# Patient Record
Sex: MALE | Race: AMERICAN INDIAN OR ALASKA NATIVE
[De-identification: names, ages, dates, MRNs, and addresses within clinical notes are randomized per-mention and may not be internally consistent; named-entity substitution may affect disease eponyms.]

---

## 2017-09-03 ENCOUNTER — HOSPITAL ENCOUNTER (EMERGENCY)
Dept: HOSPITAL 5 - ED | Age: 22
Discharge: HOME | End: 2017-09-03
Payer: COMMERCIAL

## 2017-09-03 VITALS — DIASTOLIC BLOOD PRESSURE: 78 MMHG | SYSTOLIC BLOOD PRESSURE: 125 MMHG

## 2017-09-03 DIAGNOSIS — K04.7: Primary | ICD-10-CM

## 2017-09-03 PROCEDURE — 99282 EMERGENCY DEPT VISIT SF MDM: CPT

## 2017-09-03 NOTE — EMERGENCY DEPARTMENT REPORT
HPI





- General


Chief Complaint: Dental/Oral


Time Seen by Provider: 09/03/17 22:36





- HPI


HPI: 





The patient is a 22-year-old male who presents to ED complaining  of 8/10 pain 

in the right side of his mouth x 3 days .  Patient states that the pain started 

5 days ago and has increased in severity over the last 2 days.  The pain is 

exacerbated by eating and opening of the mouth.


Patient states that it radiates towards  ear.  Patient describes a as a 

throbbing, pressure-like sensation.  Patient states otherwise well and has no 

other complaints.


Patient has had no fevers and no chills. No chest pain, no shortness of breath. 

No abdominal pain. No  shortness of breath or  recent trauma to the face. 





ED Past Medical Hx





- Past Medical History


Previous Medical History?: No





- Surgical History


Past Surgical History?: No





- Social History


Smoking Status: Never Smoker


Substance Use Type: None





- Medications


Home Medications: 


 Home Medications











 Medication  Instructions  Recorded  Confirmed  Last Taken  Type


 


Acetaminophen/Codeine [Tylenol 1 tab PO Q6H #10 tablet 09/03/17  Unknown Rx





/Codeine # 3 tab]     


 


Amoxicillin/K Clav Tab [Augmentin 1 each PO BID #20 tablet 09/03/17  Unknown Rx





875MG TAB]     


 


Ibuprofen [Motrin 400 MG tab] 400 mg PO Q8H PRN #20 tablet 09/03/17  Unknown Rx














ED Review of Systems


ROS: 


Stated complaint: TOOTHACHE


Other details as noted in HPI





Constitutional: denies: chills, fever


Eyes: denies: eye pain, eye discharge, vision change


ENT: dental pain.  denies: ear pain, throat pain, hearing loss, congestion


Respiratory: denies: cough, shortness of breath, wheezing


Cardiovascular: denies: chest pain, palpitations


Endocrine: no symptoms reported


Gastrointestinal: denies: abdominal pain, nausea, diarrhea


Genitourinary: denies: urgency, dysuria


Musculoskeletal: denies: back pain, joint swelling, arthralgia


Skin: denies: rash, lesions


Neurological: denies: headache, weakness, paresthesias


Psychiatric: denies: anxiety, depression


Hematological/Lymphatic: denies: easy bleeding, easy bruising





Physical Exam





- Physical Exam


Vital Signs: 


 Vital Signs











  09/03/17





  19:35


 


Temperature 99.4 F


 


Pulse Rate 57 L


 


Respiratory 17





Rate 


 


Blood Pressure 135/95


 


O2 Sat by Pulse 99





Oximetry 











Physical Exam: 





GENERAL: Alert and oriented x3, no apparent distress, Normal Gait, atraumatic.


HEAD: Head is normocephalic and a-traumatic.


EYES: Extra ocular muscles are intact. Pupils are equal, round, and reactive to 

light and accommodation.


EARS: symetrical, atraumatic, non tender, ear canal clear and moderate cerumen, 

tympanic membrance non inflamed. gross auditory nml bilaterally. 


NOSE: Nose symetrical, Nontender,Nares appeared normal.


MOUTH:Mouth is well hydrated and without lesions. Tonsils nonerythematous or 

swollen,  Uvula midline, Tongue not elevated. Mucous membranes are moist. 

Posterior pharynx clear, no exudate or lesions. Patent airways.  tooth #30 

shows dental caries and Gingival enlargement tooth #30, tenderness to palpation

, nontoxic bleeding no pus discharge


NECK: Supple. Non edematous, No carotid bruits.  No lymphadenopathy or 

thyromegaly.  No C-spine tenderness


LUNGS: Symetrical with respiration, No wheezing, no rales or crackles, CTAB.


HEART:  S1, S2 present, regular rate and rhythm without murmur, no rubs, no 

gallops. Non tender to palpation








PSYCHIATRIC: Mood is congruent with affect, denies suicidal or homicidal 

ideations.


SKIN:  Warm and dry, No lesions, No ulceration or induration present.











ED Course


 Vital Signs











  09/03/17





  19:35


 


Temperature 99.4 F


 


Pulse Rate 57 L


 


Respiratory 17





Rate 


 


Blood Pressure 135/95


 


O2 Sat by Pulse 99





Oximetry 














ED Medical Decision Making





- Medical Decision Making


22-year-old female who presents with righ-sided Facial pain secondary to 

odontogenic caries


ED course: Patient received 875 mg Augmentin, Tylenol No. 3. 


 Odontogenic infection versus ear infection. Based upon  history and physical 

examination,  pain is a result of an infection of tooth number 30 and that the 

pain Pt feels on the right side of his face and towards the ear is referred 

pain from this infectious process.  


Pt has no evidence of acute impending airway compromise.


At this point, patient will be discharged home on some antibiotics and pain 

trial,  he will do well with an outpatient course of antibiotics. 


Follow up with the Dental Clinic as referred


Vital signs are normal patient is in no acute distress.


Pt had an effect uneventful ED stay





Critical care attestation.: 


If time is entered above; I have spent that time in minutes in the direct care 

of this critically ill patient, excluding procedure time.








ED Disposition


Clinical Impression: 


 Dental abscess, Pain, dental





Disposition: [id number]-01 TO HOME OR SELFCARE


Is pt being admited?: No


Does the pt Need Aspirin: No


Condition: Stable


Instructions:  Dental Abscess (ED), Dental Caries (ED), Toothache (ED)


Prescriptions: 


Acetaminophen/Codeine [Tylenol /Codeine # 3 tab] 1 tab PO Q6H #10 tablet


Amoxicillin/K Clav Tab [Augmentin 875MG TAB] 1 each PO BID #20 tablet


Ibuprofen [Motrin 400 MG tab] 400 mg PO Q8H PRN #20 tablet


 PRN Reason: Pain


Referrals: 


PRIMARY CARE,MD [Primary Care Provider] - 3-5 Days


[facility] [Outside] - 3-5 Days


[facility] [Outside] - 3-5 Days


Forms:  Accompanied Note, Work/School Release Form(ED)


Time of Disposition: 23:29

## 2018-08-13 ENCOUNTER — HOSPITAL ENCOUNTER (EMERGENCY)
Dept: HOSPITAL 5 - ED | Age: 23
Discharge: HOME | End: 2018-08-13
Payer: SELF-PAY

## 2018-08-13 VITALS — SYSTOLIC BLOOD PRESSURE: 114 MMHG | DIASTOLIC BLOOD PRESSURE: 79 MMHG
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
## 2018-08-13 DIAGNOSIS — F17.200: ICD-10-CM
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
## 2018-08-13 DIAGNOSIS — S63.611A: Primary | ICD-10-CM
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
## 2018-08-13 NOTE — XRAY REPORT
FINAL REPORT



PROCEDURE:  XR HAND 3+V RT



TECHNIQUE:  RIGHT hand radiographs, AP, lateral, and oblique

views.



HISTORY:  Right finger pain. 



COMPARISON:  No prior studies are available for comparison.



FINDINGS:  

Fracture (s) and/or Dislocation(s): None .



Alignment: Normal .



Joint space(s): Normal .



Soft tissues: Normal .



Bone mineralization: Normal .



Foreign bodies: None .







IMPRESSION:  

No radiographic evidence of acute abnormality.

## 2019-02-08 ENCOUNTER — HOSPITAL ENCOUNTER (EMERGENCY)
Dept: HOSPITAL 5 - ED | Age: 24
Discharge: HOME | End: 2019-02-08
Payer: SELF-PAY

## 2019-02-08 VITALS — SYSTOLIC BLOOD PRESSURE: 104 MMHG | DIASTOLIC BLOOD PRESSURE: 66 MMHG

## 2019-02-08 DIAGNOSIS — R11.2: ICD-10-CM

## 2019-02-08 DIAGNOSIS — M79.10: ICD-10-CM

## 2019-02-08 DIAGNOSIS — K52.9: ICD-10-CM

## 2019-02-08 DIAGNOSIS — J18.1: Primary | ICD-10-CM

## 2019-02-08 DIAGNOSIS — E86.0: ICD-10-CM

## 2019-02-08 LAB
ALBUMIN SERPL-MCNC: 4.5 G/DL (ref 3.9–5)
ALT SERPL-CCNC: 13 UNITS/L (ref 7–56)
BACTERIA #/AREA URNS HPF: (no result) /HPF
BASOPHILS # (AUTO): 0.1 K/MM3 (ref 0–0.1)
BASOPHILS NFR BLD AUTO: 1.1 % (ref 0–1.8)
BILIRUB UR QL STRIP: (no result)
BLOOD UR QL VISUAL: (no result)
BUN SERPL-MCNC: 18 MG/DL (ref 9–20)
BUN/CREAT SERPL: 20 %
CALCIUM SERPL-MCNC: 9.3 MG/DL (ref 8.4–10.2)
EOSINOPHIL # BLD AUTO: 0 K/MM3 (ref 0–0.4)
EOSINOPHIL NFR BLD AUTO: 0 % (ref 0–4.3)
HCT VFR BLD CALC: 50.7 % (ref 35.5–45.6)
HEMOLYSIS INDEX: 13
HGB BLD-MCNC: 17.7 GM/DL (ref 11.8–15.2)
LYMPHOCYTES # BLD AUTO: 1.8 K/MM3 (ref 1.2–5.4)
LYMPHOCYTES NFR BLD AUTO: 22.8 % (ref 13.4–35)
MCHC RBC AUTO-ENTMCNC: 35 % (ref 32–34)
MCV RBC AUTO: 87 FL (ref 84–94)
MONOCYTES # (AUTO): 0.6 K/MM3 (ref 0–0.8)
MONOCYTES % (AUTO): 7.9 % (ref 0–7.3)
MUCOUS THREADS #/AREA URNS HPF: (no result) /HPF
PH UR STRIP: 5 [PH] (ref 5–7)
PLATELET # BLD: 170 K/MM3 (ref 140–440)
RBC # BLD AUTO: 5.82 M/MM3 (ref 3.65–5.03)
RBC #/AREA URNS HPF: < 1 /HPF (ref 0–6)
UROBILINOGEN UR-MCNC: < 2 MG/DL (ref ?–2)
WBC #/AREA URNS HPF: 6 /HPF (ref 0–6)

## 2019-02-08 PROCEDURE — 87400 INFLUENZA A/B EACH AG IA: CPT

## 2019-02-08 PROCEDURE — 81001 URINALYSIS AUTO W/SCOPE: CPT

## 2019-02-08 PROCEDURE — 94640 AIRWAY INHALATION TREATMENT: CPT

## 2019-02-08 PROCEDURE — 85025 COMPLETE CBC W/AUTO DIFF WBC: CPT

## 2019-02-08 PROCEDURE — 96361 HYDRATE IV INFUSION ADD-ON: CPT

## 2019-02-08 PROCEDURE — 87040 BLOOD CULTURE FOR BACTERIA: CPT

## 2019-02-08 PROCEDURE — 74019 RADEX ABDOMEN 2 VIEWS: CPT

## 2019-02-08 PROCEDURE — 83690 ASSAY OF LIPASE: CPT

## 2019-02-08 PROCEDURE — 99284 EMERGENCY DEPT VISIT MOD MDM: CPT

## 2019-02-08 PROCEDURE — 80053 COMPREHEN METABOLIC PANEL: CPT

## 2019-02-08 PROCEDURE — 96375 TX/PRO/DX INJ NEW DRUG ADDON: CPT

## 2019-02-08 PROCEDURE — 71046 X-RAY EXAM CHEST 2 VIEWS: CPT

## 2019-02-08 PROCEDURE — 36415 COLL VENOUS BLD VENIPUNCTURE: CPT

## 2019-02-08 PROCEDURE — 96365 THER/PROPH/DIAG IV INF INIT: CPT

## 2019-02-08 RX ADMIN — DICYCLOMINE HYDROCHLORIDE ONE MG: 20 TABLET ORAL at 18:50

## 2019-02-08 RX ADMIN — DICYCLOMINE HYDROCHLORIDE ONE: 20 TABLET ORAL at 18:57

## 2019-02-08 RX ADMIN — SODIUM CHLORIDE ONE MLS/HR: 0.9 INJECTION, SOLUTION INTRAVENOUS at 19:50

## 2019-02-08 RX ADMIN — SODIUM CHLORIDE ONE MLS/HR: 0.9 INJECTION, SOLUTION INTRAVENOUS at 19:51

## 2019-02-08 NOTE — EMERGENCY DEPARTMENT REPORT
ED N/V/D HPI





- General


Chief complaint: Nausea/Vomiting/Diarrhea


Stated complaint: FLU S/SX


Time Seen by Provider: 19 17:46


Source: patient, family


Mode of arrival: Ambulatory


Limitations: No Limitations





- History of Present Illness


Initial comments: 





This is a 24-year-old male here report that he has generalized weakness and 

nausea and diarrhea over the last couple days.  His mom said it has been 3 days 

he has been having some coughing also.  Mom reported the patient was having 10 

but she did not take his temperature but he felt hot and she gave him Advil at 3

PM.  Patient said he is feeling weak all over.  Denies any shortness of breath 

or chest pain.  Denies any earache nasal congestion or runny nose.  Denies any 

abdominal pain.  Denies any blood in vomit or in diarrhea.  Patient has no 

medical problems.  Denies any wheezing or stridor.  Denies any urinary burning, 

frequency or urgency and denies any back pain.


MD complaint: nausea, vomiting, diarrhea, other (upper respiratory symptoms)


Onset/Timing: 3


-: days(s)


Description of Vomiting: bilious


Description of Diarrhea: water


Associated Abdominal Pain: No (generalized aching to body)


Location: diffuse


Pain Scale: 8


Quality: aching


Consistency: constant


Improves with: none


Worsens with: none


Context: other (unknown)


Associated Symptoms: myalgias, cough, diaphoresis, fever/chills, loss of 

appetite, malaise, nausea/vomiting, weakness.  denies: chest pain, headaches, 

rash, dysuria, shortness of breath, syncope





- Related Data


                                  Previous Rx's











 Medication  Instructions  Recorded  Last Taken  Type


 


Acetaminophen/Codeine [Tylenol 1 tab PO Q6H #10 tablet 17 Unknown Rx





/Codeine # 3 tab]    


 


Amoxicillin/K Clav Tab [Augmentin 1 each PO BID #20 tablet 17 Unknown Rx





875MG TAB]    


 


Ibuprofen [Motrin 400 MG tab] 400 mg PO Q8H PRN #20 tablet 17 Unknown Rx


 


Ibuprofen [Motrin] 600 mg PO Q8H PRN #30 tablet 18 Unknown Rx


 


ALBUTEROL Inhaler(NF) [VENTOLIN 2 puff IH Q6H PRN 1 Days #1 inha 19 

Unknown Rx





Inhaler(NF)]    


 


Dicyclomine [Bentyl] 40 mg PO QID 3 Days #12 tablet 19 Unknown Rx


 


Doxycycline [Vibramycin CAP] 100 mg PO Q12HR 10 Days #20 capsule 19 

Unknown Rx


 


Famotidine [Pepcid] 20 mg PO BID 6 Days #12 tablet 19 Unknown Rx


 


Promethazine [Phenergan TAB] 25 mg PO Q6HR PRN #16 tab 19 Unknown Rx


 


guaiFENesin/DEXTROMETHORPHAN 10 ml PO Q6H 5 Days #200 liquid 19 Unknown Rx





[Robitussin Cough-Chest Dm Liq]    











                                    Allergies











Allergy/AdvReac Type Severity Reaction Status Date / Time


 


No Known Allergies Allergy   Verified 19 17:45














ED Review of Systems


ROS: 


Stated complaint: FLU S/SX


Other details as noted in HPI





Constitutional: chills, fever, malaise


Eyes: denies: eye discharge


ENT: throat pain.  denies: ear pain, congestion


Respiratory: cough.  denies: shortness of breath, SOB with exertion, wheezing


Cardiovascular: denies: chest pain, palpitations, dyspnea on exertion, edema, 

syncope


Gastrointestinal: nausea, vomiting, diarrhea.  denies: abdominal pain, 

constipation, hematemesis, melena, hematochezia


Genitourinary: denies: urgency, dysuria, hematuria


Musculoskeletal: myalgia.  denies: back pain, joint swelling, arthralgia


Skin: denies: rash


Neurological: denies: headache, numbness, paresthesias, confusion, abnormal 

gait, vertigo





ED Past Medical Hx





- Past Medical History


Previous Medical History?: No





- Surgical History


Past Surgical History?: No





- Family History


Family history: no significant





- Social History


Smoking Status: Never Smoker


Substance Use Type: None





- Medications


Home Medications: 


                                Home Medications











 Medication  Instructions  Recorded  Confirmed  Last Taken  Type


 


Acetaminophen/Codeine [Tylenol 1 tab PO Q6H #10 tablet 17  Unknown Rx





/Codeine # 3 tab]     


 


Amoxicillin/K Clav Tab [Augmentin 1 each PO BID #20 tablet 17  Unknown Rx





875MG TAB]     


 


Ibuprofen [Motrin 400 MG tab] 400 mg PO Q8H PRN #20 tablet 17  Unknown Rx


 


Ibuprofen [Motrin] 600 mg PO Q8H PRN #30 tablet 18  Unknown Rx


 


ALBUTEROL Inhaler(NF) [VENTOLIN 2 puff IH Q6H PRN 1 Days #1 inha 19  

Unknown Rx





Inhaler(NF)]     


 


Dicyclomine [Bentyl] 40 mg PO QID 3 Days #12 tablet 19  Unknown Rx


 


Doxycycline [Vibramycin CAP] 100 mg PO Q12HR 10 Days #20 capsule 19  

Unknown Rx


 


Famotidine [Pepcid] 20 mg PO BID 6 Days #12 tablet 19  Unknown Rx


 


Promethazine [Phenergan TAB] 25 mg PO Q6HR PRN #16 tab 19  Unknown Rx


 


guaiFENesin/DEXTROMETHORPHAN 10 ml PO Q6H 5 Days #200 liquid 19  Unknown 

Rx





[Robitussin Cough-Chest Dm Liq]     














ED Physical Exam





- General


Limitations: No Limitations


General appearance: alert, in no apparent distress





- Head


Head exam: Present: atraumatic, normocephalic, normal inspection, other (normal 

exam)





- Eye


Eye exam: Present: normal appearance, PERRL, EOMI


Pupils: Present: normal accommodation





- ENT


ENT exam: Present: normal orophraynx, mucous membranes dry, TM's normal 

bilaterally, normal external ear exam.  Absent: normal exam





- Neck


Neck exam: Present: normal inspection, full ROM, other (no C-spine tenderness). 

Absent: tenderness, lymphadenopathy





- Respiratory


Respiratory exam: Present: other (dry cough).  Absent: normal lung sounds 

bilaterally, respiratory distress, wheezes, rales, rhonchi, stridor, chest wall 

tenderness, accessory muscle use, decreased breath sounds, prolonged expiratory





- Cardiovascular


Cardiovascular Exam: Present: regular rate, normal rhythm, normal heart sounds. 

Absent: systolic murmur, diastolic murmur





- GI/Abdominal


GI/Abdominal exam: Present: soft, normal bowel sounds.  Absent: distended, 

tenderness, guarding, rebound, rigid, organomegaly, mass





- Extremities Exam


Extremities exam: Present: normal inspection, full ROM, normal capillary refill,

other (No cce. + 2 pulses in all extremities, no neurovascular compromise).  

Absent: tenderness, pedal edema, joint swelling, calf tenderness





- Back Exam


Back exam: Present: normal inspection, full ROM, other (ambulates without any 

difficulties).  Absent: tenderness, CVA tenderness (R), CVA tenderness (L), 

muscle spasm, paraspinal tenderness, vertebral tenderness, rash noted





- Neurological Exam


Neurological exam: Present: alert, oriented X3, normal gait, reflexes normal, 

other (no focal deficits).  Absent: motor sensory deficit





- Psychiatric


Psychiatric exam: Present: normal affect, normal mood





- Skin


Skin exam: Present: warm, dry, intact, normal color.  Absent: rash





ED Course


                                   Vital Signs











  19





  17:45 21:23 21:48


 


Temperature 98.6 F  


 


Pulse Rate 87  


 


Pulse Rate [  63 65





Anterior   





Bilateral   





Throughout]   


 


Respiratory 16  





Rate   


 


Respiratory  16 14





Rate [Anterior   





Bilateral   





Throughout]   


 


Blood Pressure 104/66  


 


O2 Sat by Pulse 98  





Oximetry   














  19





  23:22


 


Temperature 


 


Pulse Rate 75


 


Pulse Rate [ 





Anterior 





Bilateral 





Throughout] 


 


Respiratory 16





Rate 


 


Respiratory 





Rate [Anterior 





Bilateral 





Throughout] 


 


Blood Pressure 


 


O2 Sat by Pulse 98





Oximetry 














- Reevaluation(s)


Reevaluation #1: 





19 20:26


Patient was given normal saline 1 L, Zofran 8 mg IV, Bentyl 40 mg by mouth, 

lidocaine 15 mL and Maalox 30 mL and emergency room and he said he felt better.








Reevaluation #2: 





19 22:26


Patient was also given a second liter of IV fluid for dehydration.  Patient is 

feeling a lot better.  Was found to have pneumonia and has left lower lobe and I

spoke with Dr. Youngblood emergency room attending doctor and it was agreed on 

that patient will be placed on Zithromax IV 1 dose and sent home on 

doxycycline.  Patient lab work is stable to include CBC, and CMP.  His CBC shows

that he had some hemoconcentration and urinalysis shows the patient would small 

amount of ketones in his urine.  Patient received albuterol 5 mg nebulizer and 

Xopenex 1 mg nebulizer and his lung sounds remained clear throughout ED stay











ED Medical Decision Making





- Lab Data


Result diagrams: 


                                 19 17:53





                                 19 17:53








                                   Lab Results











  19 Range/Units





  17:53 17:53 18:22 


 


WBC  7.7    (4.5-11.0)  K/mm3


 


RBC  5.82 H    (3.65-5.03)  M/mm3


 


Hgb  17.7 H    (11.8-15.2)  gm/dl


 


Hct  50.7 H    (35.5-45.6)  %


 


MCV  87    (84-94)  fl


 


MCH  30    (28-32)  pg


 


MCHC  35 H    (32-34)  %


 


RDW  12.8 L    (13.2-15.2)  %


 


Plt Count  170    (140-440)  K/mm3


 


Lymph % (Auto)  22.8    (13.4-35.0)  %


 


Mono % (Auto)  7.9 H    (0.0-7.3)  %


 


Eos % (Auto)  0.0    (0.0-4.3)  %


 


Baso % (Auto)  1.1    (0.0-1.8)  %


 


Lymph #  1.8    (1.2-5.4)  K/mm3


 


Mono #  0.6    (0.0-0.8)  K/mm3


 


Eos #  0.0    (0.0-0.4)  K/mm3


 


Baso #  0.1    (0.0-0.1)  K/mm3


 


Seg Neutrophils %  68.2    (40.0-70.0)  %


 


Seg Neutrophils #  5.2    (1.8-7.7)  K/mm3


 


Sodium   137   (137-145)  mmol/L


 


Potassium   3.7   (3.6-5.0)  mmol/L


 


Chloride   97.0 L   ()  mmol/L


 


Carbon Dioxide   25   (22-30)  mmol/L


 


Anion Gap   19   mmol/L


 


BUN   18   (9-20)  mg/dL


 


Creatinine   0.9   (0.8-1.5)  mg/dL


 


Estimated GFR   > 60   ml/min


 


BUN/Creatinine Ratio   20   %


 


Glucose   92   ()  mg/dL


 


Calcium   9.3   (8.4-10.2)  mg/dL


 


Total Bilirubin   0.60   (0.1-1.2)  mg/dL


 


AST   24   (5-40)  units/L


 


ALT   13   (7-56)  units/L


 


Alkaline Phosphatase   69   ()  units/L


 


Total Protein   8.1   (6.3-8.2)  g/dL


 


Albumin   4.5   (3.9-5)  g/dL


 


Albumin/Globulin Ratio   1.3   %


 


Lipase   13   (13-60)  units/L


 


Urine Color    [place]  (Yellow)  


 


Urine Turbidity    Clear  (Clear)  


 


Urine pH    5.0  (5.0-7.0)  


 


Ur Specific Gravity    1.027  (1.003-1.030)  


 


Urine Protein    100 mg/dl  (Negative)  mg/dL


 


Urine Glucose (UA)    Neg  (Negative)  mg/dL


 


Urine Ketones    Tr  (Negative)  mg/dL


 


Urine Blood    Neg  (Negative)  


 


Urine Nitrite    Neg  (Negative)  


 


Urine Bilirubin    Neg  (Negative)  


 


Urine Urobilinogen    < 2.0  (<2.0)  mg/dL


 


Ur Leukocyte Esterase    Neg  (Negative)  


 


Urine WBC (Auto)    6.0  (0.0-6.0)  /HPF


 


Urine RBC (Auto)    < 1.0  (0.0-6.0)  /HPF


 


U Epithel Cells (Auto)    1.0  (0-13.0)  /HPF


 


Urine Bacteria (Auto)    1+  (Negative)  /HPF


 


Urine Mucus    3+  /HPF


 


Influenza A (Rapid)     (Negative)  


 


Influenza B (Rapid)     (Negative)  














  19 Range/Units





  18:45 


 


WBC   (4.5-11.0)  K/mm3


 


RBC   (3.65-5.03)  M/mm3


 


Hgb   (11.8-15.2)  gm/dl


 


Hct   (35.5-45.6)  %


 


MCV   (84-94)  fl


 


MCH   (28-32)  pg


 


MCHC   (32-34)  %


 


RDW   (13.2-15.2)  %


 


Plt Count   (140-440)  K/mm3


 


Lymph % (Auto)   (13.4-35.0)  %


 


Mono % (Auto)   (0.0-7.3)  %


 


Eos % (Auto)   (0.0-4.3)  %


 


Baso % (Auto)   (0.0-1.8)  %


 


Lymph #   (1.2-5.4)  K/mm3


 


Mono #   (0.0-0.8)  K/mm3


 


Eos #   (0.0-0.4)  K/mm3


 


Baso #   (0.0-0.1)  K/mm3


 


Seg Neutrophils %   (40.0-70.0)  %


 


Seg Neutrophils #   (1.8-7.7)  K/mm3


 


Sodium   (137-145)  mmol/L


 


Potassium   (3.6-5.0)  mmol/L


 


Chloride   ()  mmol/L


 


Carbon Dioxide   (22-30)  mmol/L


 


Anion Gap   mmol/L


 


BUN   (9-20)  mg/dL


 


Creatinine   (0.8-1.5)  mg/dL


 


Estimated GFR   ml/min


 


BUN/Creatinine Ratio   %


 


Glucose   ()  mg/dL


 


Calcium   (8.4-10.2)  mg/dL


 


Total Bilirubin   (0.1-1.2)  mg/dL


 


AST   (5-40)  units/L


 


ALT   (7-56)  units/L


 


Alkaline Phosphatase   ()  units/L


 


Total Protein   (6.3-8.2)  g/dL


 


Albumin   (3.9-5)  g/dL


 


Albumin/Globulin Ratio   %


 


Lipase   (13-60)  units/L


 


Urine Color   (Yellow)  


 


Urine Turbidity   (Clear)  


 


Urine pH   (5.0-7.0)  


 


Ur Specific Gravity   (1.003-1.030)  


 


Urine Protein   (Negative)  mg/dL


 


Urine Glucose (UA)   (Negative)  mg/dL


 


Urine Ketones   (Negative)  mg/dL


 


Urine Blood   (Negative)  


 


Urine Nitrite   (Negative)  


 


Urine Bilirubin   (Negative)  


 


Urine Urobilinogen   (<2.0)  mg/dL


 


Ur Leukocyte Esterase   (Negative)  


 


Urine WBC (Auto)   (0.0-6.0)  /HPF


 


Urine RBC (Auto)   (0.0-6.0)  /HPF


 


U Epithel Cells (Auto)   (0-13.0)  /HPF


 


Urine Bacteria (Auto)   (Negative)  /HPF


 


Urine Mucus   /HPF


 


Influenza A (Rapid)  Negative  (Negative)  


 


Influenza B (Rapid)  Negative  (Negative)  








Blood cultures 2 sent 





- Radiology Data


Radiology results: report reviewed





X-ray of abdomen 2 view and chest x-ray dictated by radiologist and reviewed by 

myself


[facility] 


[address] [place] [address] 





XRay Report 


Signed 





Patient: MATTHEW DAVIS MR#: [id number] 


: 1995 Acct:[id number] 


Age/Sex: 24 / M ADM Date: 19 


Loc: ED 


Attending Dr: 








Ordering Physician: ELAN JENKINS 


Date of Service: 19 


Procedure(s): XR abdomen 2V 


Accession Number(s): [id number] 





cc: ELAN JENKINS 





Fluoro Time In Minutes: 





FINAL REPORT 





EXAM: XR ABDOMEN 2V 





HISTORY: NVD, fever 





TECHNIQUE: Supine and upright views of the abdomen. 





PRIORS: None. 





FINDINGS: 


The bowel gas pattern appears normal. There is no evidence of ileus or 

obstruction. There are no 


suspicious calcifications. 





The bones and soft tissues are unremarkable. 





IMPRESSION: 


No evidence of acute abdominal disease. 











Transcribed By: RENU 


Dictated By: SEBASTIAN SPARKS MD 


Electronically Authenticated By: SEBASTIAN SPARKS MD 


Signed Date/Time: 19 











DD/DT: 19 


TD/TT: 19 





Findings


[facility] 


[address] [place] [address] 





XRay Report 


Signed 





Patient: MATTHEW DAVIS MR#: [id number] 


: 1995 Acct:[id number] 


Age/Sex: 24 / M ADM Date: 19 


Loc: ED 


Attending Dr: 








Ordering Physician: ELAN JENKINS 


Date of Service: 19 


Procedure(s): XR chest routine 2V 


Accession Number(s): [id number] 





cc: ELAN JENKINS 





Fluoro Time In Minutes: 





FINAL REPORT 





EXAM: XR CHEST ROUTINE 2V 





HISTORY: cough, fever, 





TECHNIQUE: 2 views of the chest. 





PRIORS: None. 





FINDINGS: 


The cardiomediastinal silhouette appears normal. There is a mild left upper lobe

airspace 


infiltrate. The right lung is clear. The bones and soft tissues are 

unremarkable. 





IMPRESSION: 


Mild left upper lobe airspace infiltrate consistent with pneumonia 











Transcribed By: RENU 


Dictated By: SEBASTIAN SPARKS MD 


Electronically Authenticated By: SEBASTIAN SPARKS MD 


Signed Date/Time: 19 











DD/DT: 19 


TD/TT: 19





- Medical Decision Making





This is a 24-year-old male well-nourished well-developed and here for 

gastroenteritis or a problem with complaint of fever and cough.  Chest x-ray 

shows patient with pneumonia to mid lobe, lab work show hemoconcentration CBC, 

chemistry stable and urinalysis is stable.  The cultures 2 sent.  Patient was 

given medication for nausea vomiting and diarrhea to include Bentyl, lidocaine 

and Maalox to sooth stomach,.  To he was also given IV fluid normal saline 2 L 

and Zofran IV for nausea.  I discussed lab results and x-ray results the patient

and he was understanding.  Patient given Zithromax 500 mg IV to cover pneumonia.

 I spoke with Dr. Youngblood and he is in agreement with treatment plan.  Patient 

to be discharged home on doxycycline and albuterol HFA and Robitussin-DM to 

cover pneumonia, cough.  He is to follow-up with his primary care physician or 

if not [facility] in 3 days and if condition worsens to return to the 

emergency room





- Differential Diagnosis


PNA, viral syndrome, pancreatitis, liver disease OBS, gastroenteritis, UTI


Critical care attestation.: 


If time is entered above; I have spent that time in minutes in the direct care 

of this critically ill patient, excluding procedure time.








ED Disposition


Clinical Impression: 


 Cough in adult, Nausea vomiting and diarrhea





Pneumonia, community acquired


Qualifiers:


 Laterality: left Lung location: upper lobe of lung Qualified Code(s): J18.1 - 

Lobar pneumonia, unspecified organism





Disposition:  TO HOME OR SELFCARE


Is pt being admited?: No


Does the pt Need Aspirin: No


Condition: Stable


Instructions:  Dehydration (ED), Gastroenteritis (ED), Acute Nausea and Vomiting

(ED), Acute Diarrhea (ED), Community-acquired Pneumonia (ED), Nutrition Tips for

Relief of Diarrhea (ED), Acute Cough (ED)


Additional Instructions: 


Please follow up with primary care physician in 3 days and if you do not have a 

primary care physician follow-up at [facility]


Start taking doxycycline and at 10 AM in the morning and take it twice a day for

10 days.


Use albuterol inhaler every 6 hours 2 days and then as needed


If you develop fever and/or pain . take 500 mg OTC Tylenol every 6 hours as 

needed.  Do not exceed 4 g daily


Increase her fluid intake to 2-3 L of water daily.


Take Bentyl, Phenergan nausea and vomiting.  Please do not drive or operate 

heavy machinery while taking Phenergan as this medication causes drowsiness


Start with diet that includes banana, rice, applesauce and toast, Gatorade and 

water and after 3 days he can advance slowly.


Prescriptions: 


ALBUTEROL Inhaler(NF) [VENTOLIN Inhaler(NF)] 2 puff IH Q6H PRN 1 Days #1 inha


 PRN Reason: cough and wheezing


Dicyclomine [Bentyl] 40 mg PO QID 3 Days #12 tablet


Doxycycline [Vibramycin CAP] 100 mg PO Q12HR 10 Days #20 capsule


Famotidine [Pepcid] 20 mg PO BID 6 Days #12 tablet


guaiFENesin/DEXTROMETHORPHAN [Robitussin Cough-Chest Dm Liq] 10 ml PO Q6H 5 Days

#200 liquid


Promethazine [Phenergan TAB] 25 mg PO Q6HR PRN #16 tab


 PRN Reason: Nausea


Referrals: 


RAMONA RITCHIE MD [Primary Care Provider] - 19


[facility] [Outside] - 19


Forms:  Accompanied Note, Work/School Release Form(ED)

## 2019-02-08 NOTE — XRAY REPORT
FINAL REPORT



EXAM:  XR ABDOMEN 2V



HISTORY:  NVD, fever



TECHNIQUE:  Supine and upright views of the abdomen.



PRIORS:  None.



FINDINGS:  

The bowel gas pattern appears normal. There is no evidence of ileus or obstruction. There are no susp
icious calcifications.



The bones and soft tissues are unremarkable.



IMPRESSION:  

No evidence of acute abdominal disease.

## 2019-02-08 NOTE — XRAY REPORT
FINAL REPORT



EXAM:  XR CHEST ROUTINE 2V



HISTORY:  cough, fever, 



TECHNIQUE:  2 views of the chest.



PRIORS:  None.



FINDINGS:  

The cardiomediastinal silhouette appears normal. There is a mild left upper lobe airspace infiltrate.
 The right lung is clear. The bones and soft tissues are unremarkable.



IMPRESSION:  

Mild left upper lobe airspace infiltrate consistent with pneumonia

## 2019-08-14 NOTE — EMERGENCY DEPARTMENT REPORT
ED Upper Extremity Inj HPI





- General


Chief Complaint: Extremity Injury, Upper


Stated Complaint: DISLOCATED FINGER


Time Seen by Provider: 08/13/18 17:02


Source: patient


Mode of arrival: Ambulatory


Limitations: No Limitations





- History of Present Illness


Initial Comments: 





This is a 23-year-old male nontoxic, well nourished in appearance, no acute 

signs of distress presents to the ED with c/o of left index finger pain 3 

days.  Patient stated that he was playing basketball and injured his finger 

during playing basketball.  Patient denies any other trauma.  Patient denies 

any numbness, tingling, fever, chills, nausea, vomiting, chest pain, shortness 

of breath, headache, stiff neck.  Patient denies any joint swelling or joint 

redness.  Patient stated has decreased range of motion due to pain.  Patient 

denies any allergies or significant past medical history.


MD Complaint: Injury to:: left, finger


-: days(s) (3)


Other Extremity Injury: Fingers: Left


Other Injuries: none


Place: outdoors


Severity scale (0 -10): 8


Improves With: immobilization


Worsens With: movement of extremity


Context: direct blow


Associated Symptoms: denies other symptoms.  denies: weakness, numbness, neck 

pain, suspects foreign body, nausea/vomiting, heard/felt popping sensat





- Related Data


 Previous Rx's











 Medication  Instructions  Recorded  Last Taken  Type


 


Acetaminophen/Codeine [Tylenol 1 tab PO Q6H #10 tablet 09/03/17 Unknown Rx





/Codeine # 3 tab]    


 


Amoxicillin/K Clav Tab [Augmentin 1 each PO BID #20 tablet 09/03/17 Unknown Rx





875MG TAB]    


 


Ibuprofen [Motrin 400 MG tab] 400 mg PO Q8H PRN #20 tablet 09/03/17 Unknown Rx


 


Ibuprofen [Motrin] 600 mg PO Q8H PRN #30 tablet 08/13/18 Unknown Rx











 Allergies











Allergy/AdvReac Type Severity Reaction Status Date / Time


 


No Known Allergies Allergy   Verified 08/13/18 16:15














ED Review of Systems


ROS: 


Stated complaint: DISLOCATED FINGER


Other details as noted in HPI





Constitutional: denies: chills, fever


Eyes: denies: eye pain, eye discharge, vision change


ENT: denies: ear pain, throat pain


Respiratory: denies: cough, shortness of breath, wheezing


Cardiovascular: denies: chest pain, palpitations


Endocrine: no symptoms reported


Gastrointestinal: denies: abdominal pain, nausea, diarrhea


Genitourinary: denies: urgency, dysuria


Musculoskeletal: denies: back pain, joint swelling, arthralgia


Skin: denies: rash, lesions


Neurological: denies: headache, weakness, paresthesias


Psychiatric: denies: anxiety, depression


Hematological/Lymphatic: denies: easy bleeding, easy bruising





ED Past Medical Hx





- Past Medical History


Previous Medical History?: No





- Surgical History


Past Surgical History?: No





- Social History


Smoking Status: Current Every Day Smoker


Substance Use Type: None





- Medications


Home Medications: 


 Home Medications











 Medication  Instructions  Recorded  Confirmed  Last Taken  Type


 


Acetaminophen/Codeine [Tylenol 1 tab PO Q6H #10 tablet 09/03/17  Unknown Rx





/Codeine # 3 tab]     


 


Amoxicillin/K Clav Tab [Augmentin 1 each PO BID #20 tablet 09/03/17  Unknown Rx





875MG TAB]     


 


Ibuprofen [Motrin 400 MG tab] 400 mg PO Q8H PRN #20 tablet 09/03/17  Unknown Rx


 


Ibuprofen [Motrin] 600 mg PO Q8H PRN #30 tablet 08/13/18  Unknown Rx














ED Physical Exam





- General


Limitations: No Limitations


General appearance: alert, in no apparent distress





- Head


Head exam: Present: atraumatic, normocephalic





- Eye


Eye exam: Present: normal appearance





- ENT


ENT exam: Present: mucous membranes moist





- Neck


Neck exam: Present: normal inspection





- Respiratory


Respiratory exam: Present: normal lung sounds bilaterally.  Absent: respiratory 

distress





- Cardiovascular


Cardiovascular Exam: Present: regular rate, normal rhythm.  Absent: systolic 

murmur, diastolic murmur, rubs, gallop





- GI/Abdominal


GI/Abdominal exam: Present: soft, normal bowel sounds





- Rectal


Rectal exam: Present: deferred





- Extremities Exam


Extremities exam: Present: normal inspection, full ROM, tenderness, normal 

capillary refill.  Absent: joint swelling





- Expanded Upper Extremity Exam


  ** Left


General: Present: normal inspection


Shoulder Exam: Present: normal inspection, full ROM.  Absent: tenderness, 

swelling


Upper Arm exam: Present: normal inspection, full ROM.  Absent: tenderness, 

swelling


Elbow exam: Present: normal inspection, full ROM.  Absent: tenderness, swelling


Forearm Wrist exam: Present: normal inspection, full ROM.  Absent: tenderness, 

swelling


Hand Wrist exam: Present: normal inspection, full ROM, tenderness, swelling.  

Absent: abrasion, laceration, ecchymosis, deformity, crepidus, dislocation, 

erythema, amputation, nail avulsion, subungual hematoma


Neuro motor exam: Present: wrist extension intact, thumb opposition intact, 

thumb IP flexion intact, thumb adduction intact, fingers 2-5 abduction intact


Neurosensory exam: Present: 2-point discrimination, radial nerve intact, ulnar 

nerve intact, median nerve intact


Vascular: Present: vascular compromise, normal capillary refill, radial pulse, 

brachial pulse, ulnar pulse





- Back Exam


Back exam: Present: normal inspection





- Neurological Exam


Neurological exam: Present: alert, oriented X3





- Psychiatric


Psychiatric exam: Present: normal affect, normal mood





- Skin


Skin exam: Present: warm, dry, intact, normal color.  Absent: rash





ED Course


 Vital Signs











  08/13/18





  16:15


 


Temperature 98.4 F


 


Pulse Rate 86


 


Respiratory 16





Rate 


 


Blood Pressure 114/79


 


O2 Sat by Pulse 98





Oximetry 














- Reevaluation(s)


Reevaluation #1: 





08/13/18 18:12


Patient is speaking in full sentences with no signs of distress noted.





ED Medical Decision Making





- Medical Decision Making





This is a 23-year-old female that presents with left finger strain.  Patient is 

stable and was examined by me.  X-ray has been obtained and dictated by the 

radiologist.  Patient is notified of the x-ray report with noted by the 

patient.  Patient does have normal gait with no tenderness and no joint 

swelling.  No ecchymosis. no joint redness or swelling. Not warm to touch. No 

signs of cellulites present. Patient received a sliver finger metal splint for 

pain comfort.  Post splint assessment: neurovasular intact; normal cap refill <

2 second; normal sensation; denies decreaed sensation; normal ROM of digits..  

Patient was instructed to RICE therapy.  Patient received Motrin for pain.  

Patient is discharged with Motrin. At time of discharge, the patient does not 

seem toxic or ill in appearance.  No acute signs of distress noted.  Patient 

agrees to discharge treatment plan of care.  No further questions noted by the 

patient.


Critical care attestation.: 


If time is entered above; I have spent that time in minutes in the direct care 

of this critically ill patient, excluding procedure time.








ED Disposition


Clinical Impression: 


Sprain of left index finger


Qualifiers:


 Encounter type: initial encounter Sprain of finger site: unspecified site 

Qualified Code(s): S63.611A - Unspecified sprain of left index finger, initial 

encounter





Disposition: DC-01 TO HOME OR SELFCARE


Is pt being admited?: No


Does the pt Need Aspirin: No


Condition: Stable


Instructions:  Finger Sprain (ED), RICE Therapy (ED)


Additional Instructions: 


Follow-up with a primary care doctor in 3-5 days or if symptoms worsen and 

continue return to emergency room as soon as possible. 


Prescriptions: 


Ibuprofen [Motrin] 600 mg PO Q8H PRN #30 tablet


 PRN Reason: Pain


Referrals: 


PRIMARY CARE,MD [Primary Care Provider] - 3-5 Days


SAWYER RUDOLPH MD [Staff Physician] - 3-5 Days


[facility] [Outside] - 3-5 Days


Forms:  Work/School Release Form(ED) [Initial Evaluation] : an initial evaluation of [Dyspnea] : dyspnea [Atrial Fibrillation] : atrial fibrillation [Hypertension] : hypertension [FreeTextEntry1] : Lung CA, COPD, SIADH, Pericardial Window